# Patient Record
Sex: MALE | Race: WHITE | Employment: FULL TIME | URBAN - METROPOLITAN AREA
[De-identification: names, ages, dates, MRNs, and addresses within clinical notes are randomized per-mention and may not be internally consistent; named-entity substitution may affect disease eponyms.]

---

## 2023-12-07 ENCOUNTER — APPOINTMENT (OUTPATIENT)
Dept: GENERAL RADIOLOGY | Age: 48
DRG: 580 | End: 2023-12-07
Payer: COMMERCIAL

## 2023-12-07 ENCOUNTER — HOSPITAL ENCOUNTER (INPATIENT)
Age: 48
LOS: 3 days | Discharge: HOME | DRG: 580 | End: 2023-12-10
Attending: EMERGENCY MEDICINE | Admitting: SURGERY
Payer: COMMERCIAL

## 2023-12-07 ENCOUNTER — APPOINTMENT (OUTPATIENT)
Dept: CT IMAGING | Age: 48
DRG: 580 | End: 2023-12-07
Payer: COMMERCIAL

## 2023-12-07 DIAGNOSIS — R79.89 ELEVATED TROPONIN: ICD-10-CM

## 2023-12-07 DIAGNOSIS — S22.42XA CLOSED FRACTURE OF MULTIPLE RIBS OF LEFT SIDE, INITIAL ENCOUNTER: ICD-10-CM

## 2023-12-07 DIAGNOSIS — E66.01 CLASS 2 SEVERE OBESITY DUE TO EXCESS CALORIES WITH SERIOUS COMORBIDITY AND BODY MASS INDEX (BMI) OF 35.0 TO 35.9 IN ADULT (HCC): ICD-10-CM
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
## 2023-12-07 DIAGNOSIS — R07.2 PRECORDIAL PAIN: ICD-10-CM

## 2023-12-07 DIAGNOSIS — R58 INTRA ABDOMINAL HEMORRHAGE: ICD-10-CM

## 2023-12-07 DIAGNOSIS — I48.0 PAF (PAROXYSMAL ATRIAL FIBRILLATION) (HCC): ICD-10-CM

## 2023-12-07 DIAGNOSIS — T14.90XA TRAUMA: ICD-10-CM

## 2023-12-07 LAB
ABO + RH BLD: NORMAL
ALBUMIN SERPL-MCNC: 4.1 G/DL (ref 3.5–5.2)
ALP SERPL-CCNC: 84 U/L (ref 40–129)
ALT SERPL-CCNC: 112 U/L (ref 0–40)
AMPHET UR QL SCN: NEGATIVE
ANION GAP SERPL CALCULATED.3IONS-SCNC: 15 MMOL/L (ref 7–16)
APAP SERPL-MCNC: <5 UG/ML (ref 10–30)
ARM BAND NUMBER: NORMAL
AST SERPL-CCNC: 109 U/L (ref 0–39)
B.E.: -3.9 MMOL/L (ref -3–3)
BARBITURATES UR QL SCN: NEGATIVE
BASOPHILS # BLD: 0.05 K/UL (ref 0–0.2)
BASOPHILS NFR BLD: 0 % (ref 0–2)
BENZODIAZ UR QL: NEGATIVE
BILIRUB SERPL-MCNC: 0.5 MG/DL (ref 0–1.2)
BLOOD BANK SAMPLE EXPIRATION: NORMAL
BLOOD GROUP ANTIBODIES SERPL: NEGATIVE
BUN SERPL-MCNC: 18 MG/DL (ref 6–20)
BUPRENORPHINE UR QL: NEGATIVE
CALCIUM SERPL-MCNC: 8.3 MG/DL (ref 8.6–10.2)
CANNABINOIDS UR QL SCN: NEGATIVE
CHLORIDE SERPL-SCNC: 103 MMOL/L (ref 98–107)
CLOT ANGLE.KAOLIN INDUCED BLD RES TEG: 69.3 DEG (ref 53–70)
CO2 SERPL-SCNC: 20 MMOL/L (ref 22–29)
COCAINE UR QL SCN: NEGATIVE
COHB: 0.6 % (ref 0–1.5)
CREAT SERPL-MCNC: 1 MG/DL (ref 0.7–1.2)
CRITICAL: ABNORMAL
DATE ANALYZED: ABNORMAL
DATE OF COLLECTION: ABNORMAL
EKG ATRIAL RATE: 93 BPM
EKG P AXIS: 51 DEGREES
EKG P-R INTERVAL: 208 MS
EKG Q-T INTERVAL: 360 MS
EKG QRS DURATION: 84 MS
EKG QTC CALCULATION (BAZETT): 447 MS
EKG R AXIS: 123 DEGREES
EKG T AXIS: 16 DEGREES
EKG VENTRICULAR RATE: 93 BPM
EOSINOPHIL # BLD: 0 K/UL (ref 0.05–0.5)
EOSINOPHILS RELATIVE PERCENT: 0 % (ref 0–6)
EPL-TEG: 0 % (ref 0–15)
ERYTHROCYTE [DISTWIDTH] IN BLOOD BY AUTOMATED COUNT: 12.9 % (ref 11.5–15)
ETHANOLAMINE SERPL-MCNC: 14 MG/DL
ETHANOLAMINE SERPL-MCNC: <10 MG/DL
FENTANYL UR QL: NEGATIVE
G-TEG: 10.5 KDYN/CM2 (ref 4.5–11)
GFR SERPL CREATININE-BSD FRML MDRD: >60 ML/MIN/1.73M2
GLUCOSE SERPL-MCNC: 200 MG/DL (ref 74–99)
HCO3: 19.3 MMOL/L (ref 22–26)
HCT VFR BLD AUTO: 40 % (ref 37–54)
HCT VFR BLD AUTO: 43.3 % (ref 37–54)
HGB BLD-MCNC: 13.6 G/DL (ref 12.5–16.5)
HGB BLD-MCNC: 14.4 G/DL (ref 12.5–16.5)
HHB: 1.6 % (ref 0–5)
IMM GRANULOCYTES # BLD AUTO: 0.08 K/UL (ref 0–0.58)
IMM GRANULOCYTES NFR BLD: 0 % (ref 0–5)
KINETICS TEG: 1.5 MIN (ref 1–3)
LAB: ABNORMAL
LACTATE BLDV-SCNC: 2.4 MMOL/L (ref 0.5–2.2)
LY30 (LYSIS) TEG: 0 % (ref 0–8)
LYMPHOCYTES NFR BLD: 1.09 K/UL (ref 1.5–4)
LYMPHOCYTES RELATIVE PERCENT: 6 % (ref 20–42)
Lab: 1150
MA (MAX CLOT) TEG: 67.7 MM (ref 50–70)
MCH RBC QN AUTO: 29.5 PG (ref 26–35)
MCHC RBC AUTO-ENTMCNC: 33.3 G/DL (ref 32–34.5)
MCV RBC AUTO: 88.7 FL (ref 80–99.9)
METHADONE UR QL: NEGATIVE
METHB: 0.3 % (ref 0–1.5)
MODE: ABNORMAL
MONOCYTES NFR BLD: 1.12 K/UL (ref 0.1–0.95)
MONOCYTES NFR BLD: 6 % (ref 2–12)
NEUTROPHILS NFR BLD: 87 % (ref 43–80)
NEUTS SEG NFR BLD: 15.53 K/UL (ref 1.8–7.3)
O2 SATURATION: 98.4 % (ref 92–98.5)
O2HB: 97.5 % (ref 94–97)
OPERATOR ID: 5100
OPIATES UR QL SCN: NEGATIVE
OXYCODONE UR QL SCN: NEGATIVE
PATIENT TEMP: 37 C
PCO2: 30.4 MMHG (ref 35–45)
PCP UR QL SCN: NEGATIVE
PH BLOOD GAS: 7.42 (ref 7.35–7.45)
PLATELET # BLD AUTO: 216 K/UL (ref 130–450)
PMV BLD AUTO: 11.5 FL (ref 7–12)
PO2: 141.2 MMHG (ref 75–100)
POTASSIUM SERPL-SCNC: 4.8 MMOL/L (ref 3.5–5)
PROT SERPL-MCNC: 6.9 G/DL (ref 6.4–8.3)
RBC # BLD AUTO: 4.88 M/UL (ref 3.8–5.8)
REACTION TIME TEG: 4.3 MIN (ref 5–10)
SALICYLATES SERPL-MCNC: <0.3 MG/DL (ref 0–30)
SODIUM SERPL-SCNC: 138 MMOL/L (ref 132–146)
SOURCE, BLOOD GAS: ABNORMAL
TEST INFORMATION: NORMAL
THB: 14.7 G/DL (ref 11.5–16.5)
TIME ANALYZED: 1159
TOXIC TRICYCLIC SC,BLOOD: NEGATIVE
TROPONIN I SERPL HS-MCNC: 109 NG/L (ref 0–11)
TROPONIN I SERPL HS-MCNC: 60 NG/L (ref 0–11)
TROPONIN I SERPL HS-MCNC: 82 NG/L (ref 0–11)
TSH SERPL DL<=0.05 MIU/L-ACNC: 0.91 UIU/ML (ref 0.27–4.2)
WBC OTHER # BLD: 17.9 K/UL (ref 4.5–11.5)

## 2023-12-07 PROCEDURE — 2580000003 HC RX 258

## 2023-12-07 PROCEDURE — 72125 CT NECK SPINE W/O DYE: CPT

## 2023-12-07 PROCEDURE — 84443 ASSAY THYROID STIM HORMONE: CPT

## 2023-12-07 PROCEDURE — 6810039000 HC L1 TRAUMA ALERT

## 2023-12-07 PROCEDURE — 85384 FIBRINOGEN ACTIVITY: CPT

## 2023-12-07 PROCEDURE — 2060000000 HC ICU INTERMEDIATE R&B

## 2023-12-07 PROCEDURE — 85014 HEMATOCRIT: CPT

## 2023-12-07 PROCEDURE — 99285 EMERGENCY DEPT VISIT HI MDM: CPT

## 2023-12-07 PROCEDURE — 36410 VNPNXR 3YR/> PHY/QHP DX/THER: CPT | Performed by: SURGERY

## 2023-12-07 PROCEDURE — 36600 WITHDRAWAL OF ARTERIAL BLOOD: CPT | Performed by: SURGERY

## 2023-12-07 PROCEDURE — 93005 ELECTROCARDIOGRAM TRACING: CPT

## 2023-12-07 PROCEDURE — 86900 BLOOD TYPING SEROLOGIC ABO: CPT

## 2023-12-07 PROCEDURE — 82805 BLOOD GASES W/O2 SATURATION: CPT

## 2023-12-07 PROCEDURE — 74177 CT ABD & PELVIS W/CONTRAST: CPT

## 2023-12-07 PROCEDURE — 70450 CT HEAD/BRAIN W/O DYE: CPT

## 2023-12-07 PROCEDURE — 94150 VITAL CAPACITY TEST: CPT

## 2023-12-07 PROCEDURE — 6370000000 HC RX 637 (ALT 250 FOR IP)

## 2023-12-07 PROCEDURE — 86901 BLOOD TYPING SEROLOGIC RH(D): CPT

## 2023-12-07 PROCEDURE — 85576 BLOOD PLATELET AGGREGATION: CPT

## 2023-12-07 PROCEDURE — 83605 ASSAY OF LACTIC ACID: CPT

## 2023-12-07 PROCEDURE — 6360000002 HC RX W HCPCS

## 2023-12-07 PROCEDURE — G0480 DRUG TEST DEF 1-7 CLASSES: HCPCS

## 2023-12-07 PROCEDURE — 80307 DRUG TEST PRSMV CHEM ANLYZR: CPT

## 2023-12-07 PROCEDURE — 80053 COMPREHEN METABOLIC PANEL: CPT

## 2023-12-07 PROCEDURE — 93010 ELECTROCARDIOGRAM REPORT: CPT | Performed by: INTERNAL MEDICINE

## 2023-12-07 PROCEDURE — 70486 CT MAXILLOFACIAL W/O DYE: CPT

## 2023-12-07 PROCEDURE — 80143 DRUG ASSAY ACETAMINOPHEN: CPT

## 2023-12-07 PROCEDURE — 71260 CT THORAX DX C+: CPT

## 2023-12-07 PROCEDURE — 85347 COAGULATION TIME ACTIVATED: CPT

## 2023-12-07 PROCEDURE — 85390 FIBRINOLYSINS SCREEN I&R: CPT

## 2023-12-07 PROCEDURE — 71045 X-RAY EXAM CHEST 1 VIEW: CPT

## 2023-12-07 PROCEDURE — 72170 X-RAY EXAM OF PELVIS: CPT

## 2023-12-07 PROCEDURE — 6360000004 HC RX CONTRAST MEDICATION: Performed by: RADIOLOGY

## 2023-12-07 PROCEDURE — 36415 COLL VENOUS BLD VENIPUNCTURE: CPT

## 2023-12-07 PROCEDURE — 85025 COMPLETE CBC W/AUTO DIFF WBC: CPT

## 2023-12-07 PROCEDURE — 86850 RBC ANTIBODY SCREEN: CPT

## 2023-12-07 PROCEDURE — 84484 ASSAY OF TROPONIN QUANT: CPT

## 2023-12-07 PROCEDURE — 99285 EMERGENCY DEPT VISIT HI MDM: CPT | Performed by: SURGERY

## 2023-12-07 PROCEDURE — 80179 DRUG ASSAY SALICYLATE: CPT

## 2023-12-07 PROCEDURE — 85018 HEMOGLOBIN: CPT

## 2023-12-07 RX ORDER — SENNA AND DOCUSATE SODIUM 50; 8.6 MG/1; MG/1
1 TABLET, FILM COATED ORAL 2 TIMES DAILY
Status: DISCONTINUED | OUTPATIENT
Start: 2023-12-07 | End: 2023-12-10 | Stop reason: HOSPADM

## 2023-12-07 RX ORDER — ONDANSETRON 4 MG/1
4 TABLET, ORALLY DISINTEGRATING ORAL EVERY 8 HOURS PRN
Status: DISCONTINUED | OUTPATIENT
Start: 2023-12-07 | End: 2023-12-10 | Stop reason: HOSPADM

## 2023-12-07 RX ORDER — TRAMADOL HYDROCHLORIDE 50 MG/1
50 TABLET ORAL EVERY 6 HOURS PRN
Status: DISCONTINUED | OUTPATIENT
Start: 2023-12-07 | End: 2023-12-08

## 2023-12-07 RX ORDER — SODIUM CHLORIDE, SODIUM LACTATE, POTASSIUM CHLORIDE, CALCIUM CHLORIDE 600; 310; 30; 20 MG/100ML; MG/100ML; MG/100ML; MG/100ML
INJECTION, SOLUTION INTRAVENOUS CONTINUOUS
Status: DISCONTINUED | OUTPATIENT
Start: 2023-12-07 | End: 2023-12-08

## 2023-12-07 RX ORDER — POLYETHYLENE GLYCOL 3350 17 G/17G
17 POWDER, FOR SOLUTION ORAL DAILY
Status: DISCONTINUED | OUTPATIENT
Start: 2023-12-07 | End: 2023-12-10 | Stop reason: HOSPADM

## 2023-12-07 RX ORDER — PAROXETINE HYDROCHLORIDE 40 MG/1
40 TABLET, FILM COATED ORAL EVERY MORNING
COMMUNITY

## 2023-12-07 RX ORDER — OXYCODONE HYDROCHLORIDE 10 MG/1
10 TABLET ORAL EVERY 4 HOURS PRN
Status: DISCONTINUED | OUTPATIENT
Start: 2023-12-07 | End: 2023-12-07

## 2023-12-07 RX ORDER — BENAZEPRIL HYDROCHLORIDE 10 MG/1
10 TABLET ORAL DAILY
COMMUNITY

## 2023-12-07 RX ORDER — METHOCARBAMOL 500 MG/1
1000 TABLET, FILM COATED ORAL 4 TIMES DAILY
Status: DISCONTINUED | OUTPATIENT
Start: 2023-12-07 | End: 2023-12-10 | Stop reason: HOSPADM

## 2023-12-07 RX ORDER — FLECAINIDE ACETATE 100 MG/1
100 TABLET ORAL 2 TIMES DAILY
COMMUNITY

## 2023-12-07 RX ORDER — LIDOCAINE HYDROCHLORIDE AND EPINEPHRINE 10; 10 MG/ML; UG/ML
INJECTION, SOLUTION INFILTRATION; PERINEURAL
Status: COMPLETED
Start: 2023-12-07 | End: 2023-12-08

## 2023-12-07 RX ORDER — SODIUM CHLORIDE 0.9 % (FLUSH) 0.9 %
10 SYRINGE (ML) INJECTION EVERY 12 HOURS SCHEDULED
Status: DISCONTINUED | OUTPATIENT
Start: 2023-12-07 | End: 2023-12-10 | Stop reason: HOSPADM

## 2023-12-07 RX ORDER — GUSELKUMAB 100 MG/ML
100 INJECTION SUBCUTANEOUS
COMMUNITY

## 2023-12-07 RX ORDER — SODIUM CHLORIDE 0.9 % (FLUSH) 0.9 %
10 SYRINGE (ML) INJECTION PRN
Status: DISCONTINUED | OUTPATIENT
Start: 2023-12-07 | End: 2023-12-10 | Stop reason: HOSPADM

## 2023-12-07 RX ORDER — SODIUM CHLORIDE 9 MG/ML
INJECTION, SOLUTION INTRAVENOUS PRN
Status: DISCONTINUED | OUTPATIENT
Start: 2023-12-07 | End: 2023-12-10 | Stop reason: HOSPADM

## 2023-12-07 RX ORDER — ACETAMINOPHEN 325 MG/1
650 TABLET ORAL EVERY 4 HOURS PRN
Status: DISCONTINUED | OUTPATIENT
Start: 2023-12-07 | End: 2023-12-10 | Stop reason: HOSPADM

## 2023-12-07 RX ORDER — BACITRACIN ZINC 500 [USP'U]/G
OINTMENT TOPICAL ONCE
Status: COMPLETED | OUTPATIENT
Start: 2023-12-07 | End: 2023-12-07

## 2023-12-07 RX ORDER — ONDANSETRON 2 MG/ML
4 INJECTION INTRAMUSCULAR; INTRAVENOUS EVERY 6 HOURS PRN
Status: DISCONTINUED | OUTPATIENT
Start: 2023-12-07 | End: 2023-12-10 | Stop reason: HOSPADM

## 2023-12-07 RX ORDER — METOPROLOL SUCCINATE 100 MG/1
100 TABLET, EXTENDED RELEASE ORAL DAILY
COMMUNITY

## 2023-12-07 RX ORDER — OXYCODONE HYDROCHLORIDE 5 MG/1
5 TABLET ORAL EVERY 4 HOURS PRN
Status: DISCONTINUED | OUTPATIENT
Start: 2023-12-07 | End: 2023-12-07

## 2023-12-07 RX ADMIN — ONDANSETRON 4 MG: 2 INJECTION INTRAMUSCULAR; INTRAVENOUS at 12:46

## 2023-12-07 RX ADMIN — IOPAMIDOL 75 ML: 755 INJECTION, SOLUTION INTRAVENOUS at 12:30

## 2023-12-07 RX ADMIN — OXYCODONE HYDROCHLORIDE 10 MG: 10 TABLET ORAL at 15:50

## 2023-12-07 RX ADMIN — BACITRACIN ZINC: 500 OINTMENT TOPICAL at 15:54

## 2023-12-07 RX ADMIN — SODIUM CHLORIDE, POTASSIUM CHLORIDE, SODIUM LACTATE AND CALCIUM CHLORIDE: 600; 310; 30; 20 INJECTION, SOLUTION INTRAVENOUS at 13:19

## 2023-12-07 RX ADMIN — METHOCARBAMOL 1000 MG: 500 TABLET ORAL at 22:35

## 2023-12-07 RX ADMIN — METHOCARBAMOL 1000 MG: 500 TABLET ORAL at 15:50

## 2023-12-07 RX ADMIN — HYDROMORPHONE HYDROCHLORIDE 0.5 MG: 1 INJECTION, SOLUTION INTRAMUSCULAR; INTRAVENOUS; SUBCUTANEOUS at 12:44

## 2023-12-07 ASSESSMENT — PAIN DESCRIPTION - ORIENTATION: ORIENTATION: RIGHT

## 2023-12-07 ASSESSMENT — PAIN SCALES - GENERAL
PAINLEVEL_OUTOF10: 8
PAINLEVEL_OUTOF10: 5
PAINLEVEL_OUTOF10: 5
PAINLEVEL_OUTOF10: 9
PAINLEVEL_OUTOF10: 7

## 2023-12-07 ASSESSMENT — PAIN - FUNCTIONAL ASSESSMENT
PAIN_FUNCTIONAL_ASSESSMENT: 0-10
PAIN_FUNCTIONAL_ASSESSMENT: PREVENTS OR INTERFERES SOME ACTIVE ACTIVITIES AND ADLS

## 2023-12-07 ASSESSMENT — PAIN DESCRIPTION - LOCATION
LOCATION: RIB CAGE
LOCATION: CHEST

## 2023-12-07 ASSESSMENT — PAIN DESCRIPTION - DESCRIPTORS
DESCRIPTORS: ACHING;DISCOMFORT;STABBING;THROBBING
DESCRIPTORS: ACHING

## 2023-12-07 ASSESSMENT — LIFESTYLE VARIABLES
HOW OFTEN DO YOU HAVE A DRINK CONTAINING ALCOHOL: NEVER
HOW MANY STANDARD DRINKS CONTAINING ALCOHOL DO YOU HAVE ON A TYPICAL DAY: PATIENT DOES NOT DRINK

## 2023-12-07 NOTE — ED NOTES
Dr. Aguilar Benitez notified of patients increasing troponin.       Carolina Peterson RN  12/07/23 1022

## 2023-12-07 NOTE — ED NOTES
Abrasion noted to left elbow. Trauma panel drawn by dr. Parvez Alcazar to right femerol artery-pt tolerated well and pressure applied per protocol.       Kym Olivares RN  12/07/23 2086

## 2023-12-07 NOTE — ED NOTES
Pt to ed room 10 from ct scan-report given to [address]und[address] Morro, rn  and barb, rn.      Darrell Yoo, RN  12/07/23 9648

## 2023-12-07 NOTE — ED NOTES
Abrasion noted to left chest and left knee. Pt logrolled and spinal neutrality maintained-back palpated by dr. Robles Smaller findings.       Jason Dowell RN  12/07/23 0947

## 2023-12-07 NOTE — ED NOTES
Abg drawn by dr. Pastora Trejo via right femerol artery-pt tolerated well and pressure held per protocol. Smi 500.      Meri Tran RN  12/07/23 1153       Meri Trna RN  12/07/23 115

## 2023-12-07 NOTE — ED NOTES
Nurse to nurse called to [facility].       Navdeep Bettencourt RN  12/07/23 1998

## 2023-12-07 NOTE — ED NOTES
C collar applied on arrival to ed. Nrm applied on arrival to ed.       Kimberly Rossi RN  12/07/23 6379

## 2023-12-07 NOTE — ED NOTES
Lacerations noted to forehead/scalp/bridge of nose.  Pt to ct scan with this rn.      Delisa Bella RN  12/07/23 1273

## 2023-12-07 NOTE — PROCEDURES
Laceration Repair Procedure Note    Indication: Laceration to the left forehead    Procedure: Patient patient had a laceration to the left forehead. Laceration has a hematoma inside which was evacuated. Laceration was then cleaned with normal saline and then with Betadine. Area was then cleansed and prepped and sterile fashion. Local anesthesia was administered administered with 1% lidocaine with epinephrine. Laceration extended to subcutaneous tissue with some bleeding from dermis. Bleeding was controlled with Vicryl sutures. Laceration was then approximated with Vicryl sutures as well. Skin was then closed with chromic gut suture in a interlocked continuous fashion. Total repaired wound length: 15 cm. Other Items: 10 cc lidocaine used     The patient tolerated the procedure well.     Complications: None    Electronically signed by Brody Webb DO on 12/7/2023 at 6:57 PM Yes

## 2023-12-08 ENCOUNTER — APPOINTMENT (OUTPATIENT)
Age: 48
DRG: 580 | End: 2023-12-08
Attending: INTERNAL MEDICINE
Payer: COMMERCIAL

## 2023-12-08 PROBLEM — I25.10 CORONARY ARTERY DISEASE INVOLVING NATIVE CORONARY ARTERY OF NATIVE HEART WITHOUT ANGINA PECTORIS: Status: ACTIVE | Noted: 2023-12-08

## 2023-12-08 PROBLEM — S22.22XA CLOSED FRACTURE OF BODY OF STERNUM: Status: ACTIVE | Noted: 2023-12-08

## 2023-12-08 PROBLEM — R79.89 ELEVATED TROPONIN: Status: ACTIVE | Noted: 2023-12-08

## 2023-12-08 PROBLEM — I48.0 PAF (PAROXYSMAL ATRIAL FIBRILLATION) (HCC): Status: ACTIVE | Noted: 2023-12-08

## 2023-12-08 PROBLEM — I10 PRIMARY HYPERTENSION: Status: ACTIVE | Noted: 2023-12-08

## 2023-12-08 PROBLEM — R58 INTRA ABDOMINAL HEMORRHAGE: Status: ACTIVE | Noted: 2023-12-08
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
## 2023-12-08 LAB
ANION GAP SERPL CALCULATED.3IONS-SCNC: 18 MMOL/L (ref 7–16)
BASOPHILS # BLD: 0.02 K/UL (ref 0–0.2)
BASOPHILS NFR BLD: 0 % (ref 0–2)
BUN SERPL-MCNC: 15 MG/DL (ref 6–20)
CALCIUM SERPL-MCNC: 8.3 MG/DL (ref 8.6–10.2)
CHLORIDE SERPL-SCNC: 101 MMOL/L (ref 98–107)
CHOLEST SERPL-MCNC: 131 MG/DL
CO2 SERPL-SCNC: 18 MMOL/L (ref 22–29)
CREAT SERPL-MCNC: 0.8 MG/DL (ref 0.7–1.2)
ECHO AV MEAN GRADIENT: 2 MMHG
ECHO AV MEAN VELOCITY: 0.7 M/S
ECHO AV PEAK GRADIENT: 4 MMHG
ECHO AV PEAK VELOCITY: 1 M/S
ECHO AV VELOCITY RATIO: 0.8
ECHO AV VTI: 16.8 CM
ECHO BSA: 2.44 M2
ECHO LV EDV A4C: 89 ML
ECHO LV EDV INDEX A4C: 38 ML/M2
ECHO LV EF PHYSICIAN: 50 %
ECHO LV EJECTION FRACTION A4C: 57 %
ECHO LV ESV A4C: 38 ML
ECHO LV ESV INDEX A4C: 16 ML/M2
ECHO LV ISOVOLUMETRIC RELAXATION TIME (IVRT): 110.7 MS
ECHO LVOT AV VTI INDEX: 0.86
ECHO LVOT MEAN GRADIENT: 2 MMHG
ECHO LVOT PEAK GRADIENT: 3 MMHG
ECHO LVOT PEAK VELOCITY: 0.8 M/S
ECHO LVOT VTI: 14.4 CM
ECHO MV A VELOCITY: 0.73 M/S
ECHO MV AREA PHT: 4.1 CM2
ECHO MV E DECELERATION TIME (DT): 197.6 MS
ECHO MV E VELOCITY: 0.83 M/S
ECHO MV E/A RATIO: 1.14
ECHO MV LVOT VTI INDEX: 1.49
ECHO MV MAX VELOCITY: 0.9 M/S
ECHO MV MEAN GRADIENT: 2 MMHG
ECHO MV MEAN VELOCITY: 0.6 M/S
ECHO MV PEAK GRADIENT: 4 MMHG
ECHO MV PRESSURE HALF TIME (PHT): 53.8 MS
ECHO MV VTI: 21.4 CM
ECHO PV MAX VELOCITY: 0.9 M/S
ECHO PV MEAN GRADIENT: 2 MMHG
ECHO PV MEAN VELOCITY: 0.7 M/S
ECHO PV PEAK GRADIENT: 3 MMHG
ECHO PV VTI: 15.2 CM
EOSINOPHIL # BLD: 0.01 K/UL (ref 0.05–0.5)
EOSINOPHILS RELATIVE PERCENT: 0 % (ref 0–6)
ERYTHROCYTE [DISTWIDTH] IN BLOOD BY AUTOMATED COUNT: 13 % (ref 11.5–15)
GFR SERPL CREATININE-BSD FRML MDRD: >60 ML/MIN/1.73M2
GLUCOSE SERPL-MCNC: 174 MG/DL (ref 74–99)
HBA1C MFR BLD: 6.8 % (ref 4–5.6)
HCT VFR BLD AUTO: 39.8 % (ref 37–54)
HDLC SERPL-MCNC: 31 MG/DL
HGB BLD-MCNC: 13.2 G/DL (ref 12.5–16.5)
IMM GRANULOCYTES # BLD AUTO: 0.04 K/UL (ref 0–0.58)
IMM GRANULOCYTES NFR BLD: 0 % (ref 0–5)
LDLC SERPL CALC-MCNC: 58 MG/DL
LYMPHOCYTES NFR BLD: 1.85 K/UL (ref 1.5–4)
LYMPHOCYTES RELATIVE PERCENT: 18 % (ref 20–42)
MAGNESIUM SERPL-MCNC: 2 MG/DL (ref 1.6–2.6)
MCH RBC QN AUTO: 29.2 PG (ref 26–35)
MCHC RBC AUTO-ENTMCNC: 33.2 G/DL (ref 32–34.5)
MCV RBC AUTO: 88.1 FL (ref 80–99.9)
MONOCYTES NFR BLD: 0.9 K/UL (ref 0.1–0.95)
MONOCYTES NFR BLD: 9 % (ref 2–12)
NEUTROPHILS NFR BLD: 73 % (ref 43–80)
NEUTS SEG NFR BLD: 7.43 K/UL (ref 1.8–7.3)
PLATELET # BLD AUTO: 177 K/UL (ref 130–450)
PMV BLD AUTO: 11.5 FL (ref 7–12)
POTASSIUM SERPL-SCNC: 4.3 MMOL/L (ref 3.5–5)
RBC # BLD AUTO: 4.52 M/UL (ref 3.8–5.8)
SODIUM SERPL-SCNC: 137 MMOL/L (ref 132–146)
TRIGL SERPL-MCNC: 208 MG/DL
TROPONIN I SERPL HS-MCNC: 118 NG/L (ref 0–11)
VLDLC SERPL CALC-MCNC: 42 MG/DL
WBC OTHER # BLD: 10.3 K/UL (ref 4.5–11.5)

## 2023-12-08 PROCEDURE — 85025 COMPLETE CBC W/AUTO DIFF WBC: CPT

## 2023-12-08 PROCEDURE — 0JQ10ZZ REPAIR FACE SUBCUTANEOUS TISSUE AND FASCIA, OPEN APPROACH: ICD-10-PCS

## 2023-12-08 PROCEDURE — APPSS60 APP SPLIT SHARED TIME 46-60 MINUTES: Performed by: CLINICAL NURSE SPECIALIST

## 2023-12-08 PROCEDURE — 83036 HEMOGLOBIN GLYCOSYLATED A1C: CPT

## 2023-12-08 PROCEDURE — C8929 TTE W OR WO FOL WCON,DOPPLER: HCPCS

## 2023-12-08 PROCEDURE — 6360000004 HC RX CONTRAST MEDICATION

## 2023-12-08 PROCEDURE — 09QKXZZ REPAIR NASAL MUCOSA AND SOFT TISSUE, EXTERNAL APPROACH: ICD-10-PCS

## 2023-12-08 PROCEDURE — 93306 TTE W/DOPPLER COMPLETE: CPT | Performed by: INTERNAL MEDICINE

## 2023-12-08 PROCEDURE — 99255 IP/OBS CONSLTJ NEW/EST HI 80: CPT | Performed by: INTERNAL MEDICINE

## 2023-12-08 PROCEDURE — 84484 ASSAY OF TROPONIN QUANT: CPT

## 2023-12-08 PROCEDURE — 80061 LIPID PANEL: CPT

## 2023-12-08 PROCEDURE — 6370000000 HC RX 637 (ALT 250 FOR IP)

## 2023-12-08 PROCEDURE — 0HQ0XZZ REPAIR SCALP SKIN, EXTERNAL APPROACH: ICD-10-PCS

## 2023-12-08 PROCEDURE — 6360000002 HC RX W HCPCS

## 2023-12-08 PROCEDURE — 36415 COLL VENOUS BLD VENIPUNCTURE: CPT

## 2023-12-08 PROCEDURE — 80048 BASIC METABOLIC PNL TOTAL CA: CPT

## 2023-12-08 PROCEDURE — 2500000003 HC RX 250 WO HCPCS

## 2023-12-08 PROCEDURE — 2700000000 HC OXYGEN THERAPY PER DAY

## 2023-12-08 PROCEDURE — 2060000000 HC ICU INTERMEDIATE R&B

## 2023-12-08 PROCEDURE — 2580000003 HC RX 258

## 2023-12-08 PROCEDURE — 83735 ASSAY OF MAGNESIUM: CPT

## 2023-12-08 PROCEDURE — 0JB10ZZ EXCISION OF FACE SUBCUTANEOUS TISSUE AND FASCIA, OPEN APPROACH: ICD-10-PCS

## 2023-12-08 PROCEDURE — 99232 SBSQ HOSP IP/OBS MODERATE 35: CPT | Performed by: SURGERY

## 2023-12-08 RX ORDER — LIDOCAINE HYDROCHLORIDE AND EPINEPHRINE 10; 10 MG/ML; UG/ML
20 INJECTION, SOLUTION INFILTRATION; PERINEURAL ONCE
Status: COMPLETED | OUTPATIENT
Start: 2023-12-08 | End: 2023-12-08

## 2023-12-08 RX ORDER — OXYCODONE HYDROCHLORIDE 10 MG/1
10 TABLET ORAL EVERY 4 HOURS PRN
Status: DISCONTINUED | OUTPATIENT
Start: 2023-12-08 | End: 2023-12-10 | Stop reason: HOSPADM

## 2023-12-08 RX ORDER — ACETAMINOPHEN 650 MG
TABLET, EXTENDED RELEASE ORAL ONCE
Status: COMPLETED | OUTPATIENT
Start: 2023-12-08 | End: 2023-12-08

## 2023-12-08 RX ORDER — PAROXETINE HYDROCHLORIDE 20 MG/1
40 TABLET, FILM COATED ORAL EVERY MORNING
Status: DISCONTINUED | OUTPATIENT
Start: 2023-12-08 | End: 2023-12-10 | Stop reason: HOSPADM

## 2023-12-08 RX ORDER — LIDOCAINE 4 G/G
1 PATCH TOPICAL DAILY
Status: DISCONTINUED | OUTPATIENT
Start: 2023-12-08 | End: 2023-12-10 | Stop reason: HOSPADM

## 2023-12-08 RX ORDER — BACITRACIN ZINC 500 [USP'U]/G
OINTMENT TOPICAL 3 TIMES DAILY
Status: DISCONTINUED | OUTPATIENT
Start: 2023-12-08 | End: 2023-12-10 | Stop reason: HOSPADM

## 2023-12-08 RX ORDER — METOPROLOL SUCCINATE 100 MG/1
100 TABLET, EXTENDED RELEASE ORAL DAILY
Status: DISCONTINUED | OUTPATIENT
Start: 2023-12-08 | End: 2023-12-10 | Stop reason: HOSPADM

## 2023-12-08 RX ORDER — ENOXAPARIN SODIUM 100 MG/ML
30 INJECTION SUBCUTANEOUS 2 TIMES DAILY
Status: DISCONTINUED | OUTPATIENT
Start: 2023-12-08 | End: 2023-12-10 | Stop reason: HOSPADM

## 2023-12-08 RX ORDER — FLECAINIDE ACETATE 100 MG/1
100 TABLET ORAL 2 TIMES DAILY
Status: DISCONTINUED | OUTPATIENT
Start: 2023-12-08 | End: 2023-12-10 | Stop reason: HOSPADM

## 2023-12-08 RX ORDER — OXYCODONE HYDROCHLORIDE 5 MG/1
5 TABLET ORAL EVERY 4 HOURS PRN
Status: DISCONTINUED | OUTPATIENT
Start: 2023-12-08 | End: 2023-12-10 | Stop reason: HOSPADM

## 2023-12-08 RX ORDER — KETOROLAC TROMETHAMINE 30 MG/ML
30 INJECTION, SOLUTION INTRAMUSCULAR; INTRAVENOUS EVERY 6 HOURS PRN
Status: DISCONTINUED | OUTPATIENT
Start: 2023-12-08 | End: 2023-12-10

## 2023-12-08 RX ADMIN — BACITRACIN ZINC: 500 OINTMENT TOPICAL at 20:56

## 2023-12-08 RX ADMIN — METHOCARBAMOL 1000 MG: 500 TABLET ORAL at 05:31

## 2023-12-08 RX ADMIN — SODIUM CHLORIDE, PRESERVATIVE FREE 10 ML: 5 INJECTION INTRAVENOUS at 20:49

## 2023-12-08 RX ADMIN — METHOCARBAMOL 1000 MG: 500 TABLET ORAL at 20:48

## 2023-12-08 RX ADMIN — Medication: at 08:29

## 2023-12-08 RX ADMIN — METOPROLOL SUCCINATE 100 MG: 100 TABLET, EXTENDED RELEASE ORAL at 08:18

## 2023-12-08 RX ADMIN — POLYETHYLENE GLYCOL 3350 17 G: 17 POWDER, FOR SOLUTION ORAL at 07:59

## 2023-12-08 RX ADMIN — METHOCARBAMOL 1000 MG: 500 TABLET ORAL at 12:39

## 2023-12-08 RX ADMIN — SENNOSIDES AND DOCUSATE SODIUM 1 TABLET: 8.6; 5 TABLET ORAL at 20:48

## 2023-12-08 RX ADMIN — SODIUM CHLORIDE, PRESERVATIVE FREE 10 ML: 5 INJECTION INTRAVENOUS at 07:59

## 2023-12-08 RX ADMIN — FLECAINIDE ACETATE 100 MG: 100 TABLET ORAL at 20:52

## 2023-12-08 RX ADMIN — ENOXAPARIN SODIUM 30 MG: 100 INJECTION SUBCUTANEOUS at 20:48

## 2023-12-08 RX ADMIN — LIDOCAINE HYDROCHLORIDE AND EPINEPHRINE 20 ML: 10; 10 INJECTION, SOLUTION INFILTRATION; PERINEURAL at 08:21

## 2023-12-08 RX ADMIN — BACITRACIN ZINC: 500 OINTMENT TOPICAL at 08:17

## 2023-12-08 RX ADMIN — PERFLUTREN 1.5 ML: 6.52 INJECTION, SUSPENSION INTRAVENOUS at 09:25

## 2023-12-08 RX ADMIN — METHOCARBAMOL 1000 MG: 500 TABLET ORAL at 07:59

## 2023-12-08 RX ADMIN — BACITRACIN ZINC: 500 OINTMENT TOPICAL at 14:00

## 2023-12-08 RX ADMIN — Medication: at 08:16

## 2023-12-08 RX ADMIN — LIDOCAINE HYDROCHLORIDE,EPINEPHRINE BITARTRATE 20 ML: 10; .01 INJECTION, SOLUTION INFILTRATION; PERINEURAL at 13:10

## 2023-12-08 RX ADMIN — LIDOCAINE HYDROCHLORIDE,EPINEPHRINE BITARTRATE 20 ML: 10; .01 INJECTION, SOLUTION INFILTRATION; PERINEURAL at 08:21

## 2023-12-08 RX ADMIN — HYDROMORPHONE HYDROCHLORIDE 0.5 MG: 1 INJECTION, SOLUTION INTRAMUSCULAR; INTRAVENOUS; SUBCUTANEOUS at 20:52

## 2023-12-08 RX ADMIN — Medication: at 13:11

## 2023-12-08 RX ADMIN — SENNOSIDES AND DOCUSATE SODIUM 1 TABLET: 8.6; 5 TABLET ORAL at 07:59

## 2023-12-08 RX ADMIN — PAROXETINE HYDROCHLORIDE 40 MG: 20 TABLET, FILM COATED ORAL at 08:19

## 2023-12-08 RX ADMIN — FLECAINIDE ACETATE 100 MG: 100 TABLET ORAL at 08:18

## 2023-12-08 RX ADMIN — OXYCODONE HYDROCHLORIDE 10 MG: 10 TABLET ORAL at 16:31

## 2023-12-08 RX ADMIN — HYDROMORPHONE HYDROCHLORIDE 0.5 MG: 1 INJECTION, SOLUTION INTRAMUSCULAR; INTRAVENOUS; SUBCUTANEOUS at 12:37

## 2023-12-08 RX ADMIN — HYDROMORPHONE HYDROCHLORIDE 0.5 MG: 1 INJECTION, SOLUTION INTRAMUSCULAR; INTRAVENOUS; SUBCUTANEOUS at 07:58

## 2023-12-08 RX ADMIN — ENOXAPARIN SODIUM 30 MG: 100 INJECTION SUBCUTANEOUS at 13:01

## 2023-12-08 ASSESSMENT — PAIN DESCRIPTION - LOCATION
LOCATION: CHEST;OTHER (COMMENT)
LOCATION: CHEST
LOCATION: CHEST
LOCATION: CHEST;OTHER (COMMENT)

## 2023-12-08 ASSESSMENT — PAIN DESCRIPTION - PAIN TYPE: TYPE: ACUTE PAIN

## 2023-12-08 ASSESSMENT — PAIN SCALES - GENERAL
PAINLEVEL_OUTOF10: 5
PAINLEVEL_OUTOF10: 10
PAINLEVEL_OUTOF10: 9
PAINLEVEL_OUTOF10: 5
PAINLEVEL_OUTOF10: 4
PAINLEVEL_OUTOF10: 5
PAINLEVEL_OUTOF10: 10
PAINLEVEL_OUTOF10: 8
PAINLEVEL_OUTOF10: 2
PAINLEVEL_OUTOF10: 0

## 2023-12-08 ASSESSMENT — PAIN DESCRIPTION - ORIENTATION
ORIENTATION: MID
ORIENTATION: MID
ORIENTATION: RIGHT;LEFT

## 2023-12-08 ASSESSMENT — PAIN DESCRIPTION - DESCRIPTORS
DESCRIPTORS: DISCOMFORT;SHARP
DESCRIPTORS: SHARP
DESCRIPTORS: THROBBING;ACHING;DISCOMFORT

## 2023-12-08 ASSESSMENT — PAIN DESCRIPTION - ONSET: ONSET: SUDDEN

## 2023-12-08 ASSESSMENT — PAIN - FUNCTIONAL ASSESSMENT
PAIN_FUNCTIONAL_ASSESSMENT: PREVENTS OR INTERFERES WITH ALL ACTIVE AND SOME PASSIVE ACTIVITIES
PAIN_FUNCTIONAL_ASSESSMENT: ACTIVITIES ARE NOT PREVENTED
PAIN_FUNCTIONAL_ASSESSMENT: PREVENTS OR INTERFERES WITH MANY ACTIVE NOT PASSIVE ACTIVITIES

## 2023-12-08 ASSESSMENT — PAIN DESCRIPTION - FREQUENCY: FREQUENCY: INTERMITTENT

## 2023-12-08 NOTE — PROGRESS NOTES
Date: 2023       Patient Name: Usha Gilbert  : 1975      MRN: [id number]    PT held pending STAT echo at bedside. Will follow up as appropriate.      Sandip Hernandez PT, DPT  [id number]
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
## 2023-12-08 NOTE — PROGRESS NOTES
Brendon Mccormick notified of [facility] cardio per [employer].  Pt added to census Chanel is 15w 2d pregnant 

## 2023-12-08 NOTE — PLAN OF CARE
Problem: Discharge Planning  Goal: Discharge to home or other facility with appropriate resources  Outcome: Progressing  Flowsheets (Taken 12/7/2023 2115)  Discharge to home or other facility with appropriate resources:   Identify barriers to discharge with patient and caregiver   Arrange for needed discharge resources and transportation as appropriate   Identify discharge learning needs (meds, wound care, etc)     Problem: Pain  Goal: Verbalizes/displays adequate comfort level or baseline comfort level  Outcome: Progressing

## 2023-12-08 NOTE — PROCEDURES
.Laceration Repair Procedure Note    Procedure: Laceration repair of laceration to nasal bridge, right ear and posterior scalp    Indication: As above    Procedure: A nasal bridge, right ear and posterior scalp laceration was present after patient face was cleaned and some scab was removed. Wound was cleaned with Betadine. It was then prepped in sterile fashion. Anesthesia around the laceration achieved using injection of 10 mL lidocaine w epinephrine. The laceration extended down to the level of subcutaneous tissue. Laceration to the posterior scalp and ear were closed with chromic gut in a interlocked continuous fashion. Laceration to the nasal bridge was closed with 3 chromic gut sutures in an interrupted fashion. Total repaired wound length: Laceration to posterior scalp was about 5 cm  Laceration to the ear was about 3 cm  Laceration to the nasal bridge was about 1 cm  Total of 10 cc of lidocaine with epinephrine was used    The patient tolerated the procedure well.     Complications: None    Dr. Vargas [facility] was available for the procedure     Electronically signed by Brianna Frederick DO on 12/8/2023 at 8:37 AM

## 2023-12-08 NOTE — PROGRESS NOTES
Occupational Therapy  OT SESSION ATTEMPT     Date:2023  Patient Name: Jocelyn Farias  MRN: [id number]  : 1975  Room: [address] [place] [address]     Attempted OT session this date:    [x] unavailable due to other medical staff currently with pt; STAT echo at bedside   [] on hold per nursing staff   [] on hold per nursing staff secondary to lab / radiology results    [] declined Occupational Therapy  this date due to ___. Benefits of participation in therapy reviewed with pt.    [] off unit   [] Other:     Will reattempt OT evaluation at a later time.     Stanislaw Chambers OTR/L #[id number]

## 2023-12-08 NOTE — PROCEDURES
LACERATION REPAIR PROCEDURE NOTE    Indication: Lacerations to the face    Procedure: The patient was placed in the appropriate position and anesthesia around the lacerations were obtained by infiltration using 1% Lidocaine without epinephrine. The area was then cleansed with betadine and draped in a sterile fashion. The laceration was closed with chromic gut. A second, third, fourth, laceration were closed with chromic gut. The wound area was then dressed with bacitracin. Minimal debridement was preformed, flaps were aligned. No foreign body was identified. Total repaired wound length: 3 cm. Other Items: None    The patient tolerated the procedure well.     Complications: None      Tony Block DO  Resident, PGY-1  12/8/2023  2:12 PM

## 2023-12-08 NOTE — PROGRESS NOTES
Pt rolled for personal care, pt SPO2 dropped to 88%. Pt sat up and Spo2 styed between 88%-90%, pt placed back on 2L Nasal cannula.
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
## 2023-12-08 NOTE — PROGRESS NOTES
This nurse attempted to clean blood from pts face and head, pt continued to bleed from abrasions, family helped clean pt up as well until pt unable to tolerate.
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
## 2023-12-08 NOTE — PROGRESS NOTES
4 Eyes Skin Assessment     NAME:  Gabriel Sesay  YOB: 1975  MEDICAL RECORD NUMBER:  [id number]    The patient is being assessed for  Admission    I agree that at least one RN has performed a thorough Head to Toe Skin Assessment on the patient. ALL assessment sites listed below have been assessed. Areas assessed by both nurses:    Head, Face, Ears, Shoulders, Back, Chest, Arms, Elbows, Hands, Sacrum. Buttock, Coccyx, Ischium, and Legs. Feet and Heels        Does the Patient have a Wound?  No noted wound(s)       Andrey Prevention initiated by RN: Yes  Wound Care Orders initiated by RN: No    Pressure Injury (Stage 3,4, Unstageable, DTI, NWPT, and Complex wounds) if present, place Wound referral order by RN under : No    New Ostomies, if present place, Ostomy referral order under : No     Nurse 1 eSignature: Electronically signed by Sloane Carlisle RN on 12/8/23 at 4:27 AM EST    **SHARE this note so that the co-signing nurse can place an eSignature**    Nurse 2 eSignature: Electronically signed by Souleymane Yip RN on 12/8/23 at 4:36 AM EST

## 2023-12-08 NOTE — DISCHARGE INSTRUCTIONS
TRAUMA SERVICES DISCHARGE INSTRUCTIONS    Call [phone], option 2, for any questions/concerns and for follow-up appointment in *** {DAY/WK/MON/YRS:20513}. Please follow the instructions checked below:    During the course of your workup, we identified an incidental finding of:  ***  Please follow-up with your primary care provider. ACTIVITY INSTRUCTIONS  Increase activity as tolerated  No heavy lifting or strenuous activity  Take your incentive spirometer home and use 4-6 times/day   []  No driving until cleared by ***    WOUND/DRESSING INSTRUCTIONS:  You may shower. No sitting in bath tub, hot tub or swimming until cleared by physician. Ice to areas of pain for first 24 hours. Heat to areas of pain after that. Wash areas of lacerations/abrasions with soap & water. Rinse well. Pat dry with clean towel. Apply thin layer of Bacitracin, Neosporin, or triple antibiotic cream to affected area 2-3 times per day. Keep wounds clean and dry. []  Sutures/Staples are to be removed in *** {DAY/WK/MON/YRS:20513}. MEDICATION INSTRUCTIONS  Take medication as prescribed. When taking pain medications, you may experience dizziness or drowsiness. Do not drink alcohol or drive when taking these medications. You may experience constipation while taking pain medication. You may take over the counter stool softeners such as docusate (Colace), sennosides S (Senokot-S), or Miralax.   []  You may take Ibuprofen (over the counter) as directed for mild pain. --You may take up to 800mg every 8 hours for pain, please take with food or milk.   []  You may take acetaminophen (Tylenol) products. Do NOT take more than 4000mg of Tylenol in 24h. []  Do not take any other acetaminophen (Tylenol) products if you are taking Percocet or Norco, as these contain Tylenol. --Do NOT take more than 4000mg of Tylenol in 24h.     OPIOID MEDICATION INSTRUCTIONS  Read the medication guide that is included with your you will receive a statement from [employer] monitor a cash pay option is available and financial assistance programs are available for those who qualify. For insurance coverage, financial assistance, or out-of-pocket estimates, call [employer] at [phone]    Prompt Return of the [employer] Monitor:  The device and kit components must be returned immediately upon completion of wear using the pre-paid . Delays in return may result in delayed final test results.     FOR ASSISTANCE WITH YOUR Raymond Molina CALL [facility] 24/7 at [phone]
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
## 2023-12-09 LAB
ANION GAP SERPL CALCULATED.3IONS-SCNC: 12 MMOL/L (ref 7–16)
ANION GAP SERPL CALCULATED.3IONS-SCNC: 14 MMOL/L (ref 7–16)
ANION GAP SERPL CALCULATED.3IONS-SCNC: 15 MMOL/L (ref 7–16)
BASOPHILS # BLD: 0.04 K/UL (ref 0–0.2)
BASOPHILS # BLD: 0.05 K/UL (ref 0–0.2)
BASOPHILS NFR BLD: 0 % (ref 0–2)
BASOPHILS NFR BLD: 0 % (ref 0–2)
BUN SERPL-MCNC: 14 MG/DL (ref 6–20)
BUN SERPL-MCNC: 15 MG/DL (ref 6–20)
BUN SERPL-MCNC: 16 MG/DL (ref 6–20)
CALCIUM SERPL-MCNC: 8.4 MG/DL (ref 8.6–10.2)
CALCIUM SERPL-MCNC: 8.6 MG/DL (ref 8.6–10.2)
CALCIUM SERPL-MCNC: 8.6 MG/DL (ref 8.6–10.2)
CHLORIDE SERPL-SCNC: 90 MMOL/L (ref 98–107)
CHLORIDE SERPL-SCNC: 92 MMOL/L (ref 98–107)
CHLORIDE SERPL-SCNC: 94 MMOL/L (ref 98–107)
CO2 SERPL-SCNC: 21 MMOL/L (ref 22–29)
CO2 SERPL-SCNC: 23 MMOL/L (ref 22–29)
CO2 SERPL-SCNC: 24 MMOL/L (ref 22–29)
CREAT SERPL-MCNC: 0.8 MG/DL (ref 0.7–1.2)
CREAT SERPL-MCNC: 0.9 MG/DL (ref 0.7–1.2)
CREAT SERPL-MCNC: 0.9 MG/DL (ref 0.7–1.2)
EOSINOPHIL # BLD: 0.02 K/UL (ref 0.05–0.5)
EOSINOPHIL # BLD: 0.02 K/UL (ref 0.05–0.5)
EOSINOPHILS RELATIVE PERCENT: 0 % (ref 0–6)
EOSINOPHILS RELATIVE PERCENT: 0 % (ref 0–6)
ERYTHROCYTE [DISTWIDTH] IN BLOOD BY AUTOMATED COUNT: 12.8 % (ref 11.5–15)
ERYTHROCYTE [DISTWIDTH] IN BLOOD BY AUTOMATED COUNT: 13.2 % (ref 11.5–15)
GFR SERPL CREATININE-BSD FRML MDRD: >60 ML/MIN/1.73M2
GLUCOSE SERPL-MCNC: 158 MG/DL (ref 74–99)
GLUCOSE SERPL-MCNC: 199 MG/DL (ref 74–99)
GLUCOSE SERPL-MCNC: 200 MG/DL (ref 74–99)
HCT VFR BLD AUTO: 37.3 % (ref 37–54)
HCT VFR BLD AUTO: 37.8 % (ref 37–54)
HGB BLD-MCNC: 12.4 G/DL (ref 12.5–16.5)
HGB BLD-MCNC: 12.5 G/DL (ref 12.5–16.5)
IMM GRANULOCYTES # BLD AUTO: 0.07 K/UL (ref 0–0.58)
IMM GRANULOCYTES # BLD AUTO: 0.07 K/UL (ref 0–0.58)
IMM GRANULOCYTES NFR BLD: 1 % (ref 0–5)
IMM GRANULOCYTES NFR BLD: 1 % (ref 0–5)
LYMPHOCYTES NFR BLD: 1.64 K/UL (ref 1.5–4)
LYMPHOCYTES NFR BLD: 2.07 K/UL (ref 1.5–4)
LYMPHOCYTES RELATIVE PERCENT: 14 % (ref 20–42)
LYMPHOCYTES RELATIVE PERCENT: 18 % (ref 20–42)
MCH RBC QN AUTO: 29.2 PG (ref 26–35)
MCH RBC QN AUTO: 29.5 PG (ref 26–35)
MCHC RBC AUTO-ENTMCNC: 32.8 G/DL (ref 32–34.5)
MCHC RBC AUTO-ENTMCNC: 33.5 G/DL (ref 32–34.5)
MCV RBC AUTO: 87.1 FL (ref 80–99.9)
MCV RBC AUTO: 90 FL (ref 80–99.9)
MONOCYTES NFR BLD: 1.08 K/UL (ref 0.1–0.95)
MONOCYTES NFR BLD: 1.16 K/UL (ref 0.1–0.95)
MONOCYTES NFR BLD: 10 % (ref 2–12)
MONOCYTES NFR BLD: 9 % (ref 2–12)
NEUTROPHILS NFR BLD: 72 % (ref 43–80)
NEUTROPHILS NFR BLD: 75 % (ref 43–80)
NEUTS SEG NFR BLD: 8.58 K/UL (ref 1.8–7.3)
NEUTS SEG NFR BLD: 8.84 K/UL (ref 1.8–7.3)
PLATELET # BLD AUTO: 172 K/UL (ref 130–450)
PLATELET # BLD AUTO: 191 K/UL (ref 130–450)
PMV BLD AUTO: 11 FL (ref 7–12)
PMV BLD AUTO: 11.4 FL (ref 7–12)
POTASSIUM SERPL-SCNC: 4 MMOL/L (ref 3.5–5)
POTASSIUM SERPL-SCNC: 4.3 MMOL/L (ref 3.5–5)
POTASSIUM SERPL-SCNC: 4.5 MMOL/L (ref 3.5–5)
RBC # BLD AUTO: 4.2 M/UL (ref 3.8–5.8)
RBC # BLD AUTO: 4.28 M/UL (ref 3.8–5.8)
SODIUM SERPL-SCNC: 126 MMOL/L (ref 132–146)
SODIUM SERPL-SCNC: 128 MMOL/L (ref 132–146)
SODIUM SERPL-SCNC: 131 MMOL/L (ref 132–146)
WBC OTHER # BLD: 11.8 K/UL (ref 4.5–11.5)
WBC OTHER # BLD: 11.9 K/UL (ref 4.5–11.5)

## 2023-12-09 PROCEDURE — 36415 COLL VENOUS BLD VENIPUNCTURE: CPT

## 2023-12-09 PROCEDURE — 6370000000 HC RX 637 (ALT 250 FOR IP)

## 2023-12-09 PROCEDURE — 85025 COMPLETE CBC W/AUTO DIFF WBC: CPT

## 2023-12-09 PROCEDURE — 99232 SBSQ HOSP IP/OBS MODERATE 35: CPT | Performed by: INTERNAL MEDICINE

## 2023-12-09 PROCEDURE — 6360000002 HC RX W HCPCS

## 2023-12-09 PROCEDURE — 80048 BASIC METABOLIC PNL TOTAL CA: CPT

## 2023-12-09 PROCEDURE — 99232 SBSQ HOSP IP/OBS MODERATE 35: CPT | Performed by: SURGERY

## 2023-12-09 PROCEDURE — 2060000000 HC ICU INTERMEDIATE R&B

## 2023-12-09 PROCEDURE — 2580000003 HC RX 258

## 2023-12-09 RX ORDER — ACETYLCYSTEINE 100 MG/ML
4 SOLUTION ORAL; RESPIRATORY (INHALATION) EVERY 4 HOURS
Status: DISCONTINUED | OUTPATIENT
Start: 2023-12-09 | End: 2023-12-10 | Stop reason: HOSPADM

## 2023-12-09 RX ORDER — SODIUM BICARBONATE 650 MG/1
650 TABLET ORAL 4 TIMES DAILY
Status: DISCONTINUED | OUTPATIENT
Start: 2023-12-09 | End: 2023-12-09

## 2023-12-09 RX ORDER — SODIUM BICARBONATE 650 MG/1
650 TABLET ORAL 4 TIMES DAILY
Status: DISCONTINUED | OUTPATIENT
Start: 2023-12-09 | End: 2023-12-10

## 2023-12-09 RX ORDER — 3% SODIUM CHLORIDE 3 G/100ML
25 INJECTION, SOLUTION INTRAVENOUS CONTINUOUS
Status: DISCONTINUED | OUTPATIENT
Start: 2023-12-09 | End: 2023-12-09

## 2023-12-09 RX ORDER — IPRATROPIUM BROMIDE AND ALBUTEROL SULFATE 2.5; .5 MG/3ML; MG/3ML
1 SOLUTION RESPIRATORY (INHALATION)
Status: DISCONTINUED | OUTPATIENT
Start: 2023-12-09 | End: 2023-12-10 | Stop reason: HOSPADM

## 2023-12-09 RX ADMIN — HYDROMORPHONE HYDROCHLORIDE 0.5 MG: 1 INJECTION, SOLUTION INTRAMUSCULAR; INTRAVENOUS; SUBCUTANEOUS at 02:14

## 2023-12-09 RX ADMIN — PAROXETINE HYDROCHLORIDE 40 MG: 20 TABLET, FILM COATED ORAL at 09:07

## 2023-12-09 RX ADMIN — OXYCODONE HYDROCHLORIDE 10 MG: 10 TABLET ORAL at 20:29

## 2023-12-09 RX ADMIN — SODIUM BICARBONATE 650 MG: 650 TABLET ORAL at 15:50

## 2023-12-09 RX ADMIN — SENNOSIDES AND DOCUSATE SODIUM 1 TABLET: 8.6; 5 TABLET ORAL at 20:24

## 2023-12-09 RX ADMIN — METHOCARBAMOL 1000 MG: 500 TABLET ORAL at 09:07

## 2023-12-09 RX ADMIN — BACITRACIN ZINC: 500 OINTMENT TOPICAL at 20:32

## 2023-12-09 RX ADMIN — ACETAMINOPHEN 650 MG: 325 TABLET ORAL at 11:58

## 2023-12-09 RX ADMIN — METOPROLOL SUCCINATE 100 MG: 100 TABLET, EXTENDED RELEASE ORAL at 09:07

## 2023-12-09 RX ADMIN — METHOCARBAMOL 1000 MG: 500 TABLET ORAL at 13:30

## 2023-12-09 RX ADMIN — SODIUM CHLORIDE, PRESERVATIVE FREE 10 ML: 5 INJECTION INTRAVENOUS at 20:24

## 2023-12-09 RX ADMIN — BACITRACIN ZINC: 500 OINTMENT TOPICAL at 09:24

## 2023-12-09 RX ADMIN — KETOROLAC TROMETHAMINE 30 MG: 30 INJECTION, SOLUTION INTRAMUSCULAR at 13:34

## 2023-12-09 RX ADMIN — ENOXAPARIN SODIUM 30 MG: 100 INJECTION SUBCUTANEOUS at 20:24

## 2023-12-09 RX ADMIN — HYDROMORPHONE HYDROCHLORIDE 0.5 MG: 1 INJECTION, SOLUTION INTRAMUSCULAR; INTRAVENOUS; SUBCUTANEOUS at 12:02

## 2023-12-09 RX ADMIN — POLYETHYLENE GLYCOL 3350 17 G: 17 POWDER, FOR SOLUTION ORAL at 09:07

## 2023-12-09 RX ADMIN — SENNOSIDES AND DOCUSATE SODIUM 1 TABLET: 8.6; 5 TABLET ORAL at 09:07

## 2023-12-09 RX ADMIN — BACITRACIN ZINC: 500 OINTMENT TOPICAL at 16:21

## 2023-12-09 RX ADMIN — SODIUM CHLORIDE, PRESERVATIVE FREE 10 ML: 5 INJECTION INTRAVENOUS at 09:12

## 2023-12-09 RX ADMIN — METHOCARBAMOL 1000 MG: 500 TABLET ORAL at 05:57

## 2023-12-09 RX ADMIN — FLECAINIDE ACETATE 100 MG: 100 TABLET ORAL at 20:24

## 2023-12-09 RX ADMIN — OXYCODONE HYDROCHLORIDE 10 MG: 10 TABLET ORAL at 09:07

## 2023-12-09 RX ADMIN — ENOXAPARIN SODIUM 30 MG: 100 INJECTION SUBCUTANEOUS at 09:36

## 2023-12-09 RX ADMIN — METHOCARBAMOL 1000 MG: 500 TABLET ORAL at 20:24

## 2023-12-09 RX ADMIN — SODIUM BICARBONATE 650 MG: 650 TABLET ORAL at 20:24

## 2023-12-09 RX ADMIN — FLECAINIDE ACETATE 100 MG: 100 TABLET ORAL at 09:07

## 2023-12-09 ASSESSMENT — PAIN SCALES - GENERAL
PAINLEVEL_OUTOF10: 7
PAINLEVEL_OUTOF10: 0
PAINLEVEL_OUTOF10: 10
PAINLEVEL_OUTOF10: 0
PAINLEVEL_OUTOF10: 7
PAINLEVEL_OUTOF10: 0
PAINLEVEL_OUTOF10: 8
PAINLEVEL_OUTOF10: 0
PAINLEVEL_OUTOF10: 7
PAINLEVEL_OUTOF10: 2

## 2023-12-09 ASSESSMENT — PAIN DESCRIPTION - LOCATION
LOCATION: STERNUM
LOCATION: CHEST;OTHER (COMMENT)
LOCATION: CHEST
LOCATION: CHEST
LOCATION: RIB CAGE;STERNUM

## 2023-12-09 ASSESSMENT — PAIN DESCRIPTION - ORIENTATION
ORIENTATION: MID
ORIENTATION: RIGHT;LEFT

## 2023-12-09 ASSESSMENT — PAIN - FUNCTIONAL ASSESSMENT
PAIN_FUNCTIONAL_ASSESSMENT: PREVENTS OR INTERFERES WITH MANY ACTIVE NOT PASSIVE ACTIVITIES
PAIN_FUNCTIONAL_ASSESSMENT: PREVENTS OR INTERFERES WITH MANY ACTIVE NOT PASSIVE ACTIVITIES
PAIN_FUNCTIONAL_ASSESSMENT: ACTIVITIES ARE NOT PREVENTED
PAIN_FUNCTIONAL_ASSESSMENT: ACTIVITIES ARE NOT PREVENTED

## 2023-12-09 ASSESSMENT — PAIN DESCRIPTION - DESCRIPTORS
DESCRIPTORS: ACHING;SHARP
DESCRIPTORS: ACHING;THROBBING;DISCOMFORT
DESCRIPTORS: ACHING;DISCOMFORT
DESCRIPTORS: ACHING;DISCOMFORT;SHARP

## 2023-12-09 NOTE — PLAN OF CARE
Problem: Discharge Planning  Goal: Discharge to home or other facility with appropriate resources  Recent Flowsheet Documentation  Taken 12/8/2023 2030 by Bridger Benson RN  Discharge to home or other facility with appropriate resources:   Identify barriers to discharge with patient and caregiver   Arrange for needed discharge resources and transportation as appropriate   Identify discharge learning needs (meds, wound care, etc)  12/8/2023 2000 by Awa Manzo RN  Outcome: Progressing     Problem: Pain  Goal: Verbalizes/displays adequate comfort level or baseline comfort level  12/9/2023 0203 by Bridger Benson RN  Outcome: Progressing  12/8/2023 2000 by Awa Manzo RN  Outcome: Progressing     Problem: ABCDS Injury Assessment  Goal: Absence of physical injury  12/9/2023 0203 by Bridger Benson RN  Outcome: Progressing  12/8/2023 2000 by Awa Manzo RN  Outcome: Progressing     Problem: Skin/Tissue Integrity  Goal: Absence of new skin breakdown  Description: 1. Monitor for areas of redness and/or skin breakdown  2. Assess vascular access sites hourly  3. Every 4-6 hours minimum:  Change oxygen saturation probe site  4. Every 4-6 hours:  If on nasal continuous positive airway pressure, respiratory therapy assess nares and determine need for appliance change or resting period.   12/9/2023 0203 by Bridger Benson RN  Outcome: Progressing  12/8/2023 2000 by Awa Manzo RN  Outcome: Progressing     Problem: Safety - Adult  Goal: Free from fall injury  12/9/2023 0203 by Bridger Benson RN  Outcome: Progressing  12/8/2023 2000 by Awa Manzo RN  Outcome: Progressing

## 2023-12-09 NOTE — PLAN OF CARE
Problem: Pain  Goal: Verbalizes/displays adequate comfort level or baseline comfort level  12/9/2023 1120 by Wyatt Garcia RN  Outcome: Progressing  12/9/2023 0203 by Michelle Charlton RN  Outcome: Progressing     Problem: Skin/Tissue Integrity  Goal: Absence of new skin breakdown  Description: 1. Monitor for areas of redness and/or skin breakdown  2. Assess vascular access sites hourly  3. Every 4-6 hours minimum:  Change oxygen saturation probe site  4. Every 4-6 hours:  If on nasal continuous positive airway pressure, respiratory therapy assess nares and determine need for appliance change or resting period.   12/9/2023 1120 by Wyatt Garcia RN  Outcome: Progressing  12/9/2023 0203 by Michelle Charlton RN  Outcome: Progressing     Problem: Safety - Adult  Goal: Free from fall injury  12/9/2023 1120 by Wyatt Garcia RN  Outcome: Progressing  12/9/2023 0203 by Michelle Charlton RN  Outcome: Progressing
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
## 2023-12-10 VITALS
HEIGHT: 70 IN | WEIGHT: 265 LBS | HEART RATE: 86 BPM | DIASTOLIC BLOOD PRESSURE: 86 MMHG | RESPIRATION RATE: 16 BRPM | TEMPERATURE: 98.5 F | OXYGEN SATURATION: 96 % | BODY MASS INDEX: 37.94 KG/M2 | SYSTOLIC BLOOD PRESSURE: 148 MMHG

## 2023-12-10 LAB
ANION GAP SERPL CALCULATED.3IONS-SCNC: 14 MMOL/L (ref 7–16)
BASOPHILS # BLD: 0.04 K/UL (ref 0–0.2)
BASOPHILS NFR BLD: 0 % (ref 0–2)
BUN SERPL-MCNC: 14 MG/DL (ref 6–20)
CALCIUM SERPL-MCNC: 8.4 MG/DL (ref 8.6–10.2)
CHLORIDE SERPL-SCNC: 94 MMOL/L (ref 98–107)
CO2 SERPL-SCNC: 21 MMOL/L (ref 22–29)
CREAT SERPL-MCNC: 0.8 MG/DL (ref 0.7–1.2)
EOSINOPHIL # BLD: 0.03 K/UL (ref 0.05–0.5)
EOSINOPHILS RELATIVE PERCENT: 0 % (ref 0–6)
ERYTHROCYTE [DISTWIDTH] IN BLOOD BY AUTOMATED COUNT: 12.6 % (ref 11.5–15)
GFR SERPL CREATININE-BSD FRML MDRD: >60 ML/MIN/1.73M2
GLUCOSE SERPL-MCNC: 184 MG/DL (ref 74–99)
HCT VFR BLD AUTO: 34.5 % (ref 37–54)
HGB BLD-MCNC: 11.6 G/DL (ref 12.5–16.5)
IMM GRANULOCYTES # BLD AUTO: 0.07 K/UL (ref 0–0.58)
IMM GRANULOCYTES NFR BLD: 1 % (ref 0–5)
LYMPHOCYTES NFR BLD: 1.63 K/UL (ref 1.5–4)
LYMPHOCYTES RELATIVE PERCENT: 16 % (ref 20–42)
MCH RBC QN AUTO: 29.4 PG (ref 26–35)
MCHC RBC AUTO-ENTMCNC: 33.6 G/DL (ref 32–34.5)
MCV RBC AUTO: 87.3 FL (ref 80–99.9)
MONOCYTES NFR BLD: 0.95 K/UL (ref 0.1–0.95)
MONOCYTES NFR BLD: 9 % (ref 2–12)
NEUTROPHILS NFR BLD: 74 % (ref 43–80)
NEUTS SEG NFR BLD: 7.58 K/UL (ref 1.8–7.3)
PLATELET # BLD AUTO: 173 K/UL (ref 130–450)
PMV BLD AUTO: 11.4 FL (ref 7–12)
POTASSIUM SERPL-SCNC: 4.1 MMOL/L (ref 3.5–5)
RBC # BLD AUTO: 3.95 M/UL (ref 3.8–5.8)
SODIUM SERPL-SCNC: 129 MMOL/L (ref 132–146)
WBC OTHER # BLD: 10.3 K/UL (ref 4.5–11.5)

## 2023-12-10 PROCEDURE — 97530 THERAPEUTIC ACTIVITIES: CPT

## 2023-12-10 PROCEDURE — 97165 OT EVAL LOW COMPLEX 30 MIN: CPT

## 2023-12-10 PROCEDURE — 36415 COLL VENOUS BLD VENIPUNCTURE: CPT

## 2023-12-10 PROCEDURE — 85025 COMPLETE CBC W/AUTO DIFF WBC: CPT

## 2023-12-10 PROCEDURE — 93242 EXT ECG>48HR<7D RECORDING: CPT

## 2023-12-10 PROCEDURE — 6360000002 HC RX W HCPCS

## 2023-12-10 PROCEDURE — 94640 AIRWAY INHALATION TREATMENT: CPT

## 2023-12-10 PROCEDURE — 97161 PT EVAL LOW COMPLEX 20 MIN: CPT

## 2023-12-10 PROCEDURE — 99232 SBSQ HOSP IP/OBS MODERATE 35: CPT | Performed by: INTERNAL MEDICINE

## 2023-12-10 PROCEDURE — 97535 SELF CARE MNGMENT TRAINING: CPT

## 2023-12-10 PROCEDURE — 6370000000 HC RX 637 (ALT 250 FOR IP)

## 2023-12-10 PROCEDURE — 80048 BASIC METABOLIC PNL TOTAL CA: CPT

## 2023-12-10 PROCEDURE — 2580000003 HC RX 258

## 2023-12-10 PROCEDURE — 99238 HOSP IP/OBS DSCHRG MGMT 30/<: CPT | Performed by: SURGERY

## 2023-12-10 PROCEDURE — 6370000000 HC RX 637 (ALT 250 FOR IP): Performed by: SURGERY

## 2023-12-10 RX ORDER — OXYCODONE HYDROCHLORIDE 5 MG/1
5 TABLET ORAL EVERY 6 HOURS PRN
Qty: 24 TABLET | Refills: 0 | Status: SHIPPED | OUTPATIENT
Start: 2023-12-10 | End: 2023-12-17

## 2023-12-10 RX ORDER — BISACODYL 10 MG
10 SUPPOSITORY, RECTAL RECTAL ONCE
Status: COMPLETED | OUTPATIENT
Start: 2023-12-10 | End: 2023-12-10

## 2023-12-10 RX ORDER — LIDOCAINE 4 G/G
1 PATCH TOPICAL DAILY
Qty: 120 EACH | Refills: 1 | Status: SHIPPED | OUTPATIENT
Start: 2023-12-11

## 2023-12-10 RX ORDER — SODIUM CHLORIDE 1 G/1
1 TABLET ORAL
Status: DISCONTINUED | OUTPATIENT
Start: 2023-12-10 | End: 2023-12-10 | Stop reason: HOSPADM

## 2023-12-10 RX ORDER — POLYETHYLENE GLYCOL 3350 17 G/17G
17 POWDER, FOR SOLUTION ORAL DAILY
Qty: 527 G | Refills: 0 | Status: SHIPPED | OUTPATIENT
Start: 2023-12-11 | End: 2024-01-10

## 2023-12-10 RX ORDER — METHOCARBAMOL 1000 MG/1
1000 TABLET, COATED ORAL 4 TIMES DAILY
Qty: 60 TABLET | Refills: 0 | Status: SHIPPED | OUTPATIENT
Start: 2023-12-10 | End: 2023-12-20

## 2023-12-10 RX ORDER — BACITRACIN ZINC 500 [USP'U]/G
OINTMENT TOPICAL
Qty: 30 G | Refills: 1 | Status: SHIPPED | OUTPATIENT
Start: 2023-12-10 | End: 2023-12-20

## 2023-12-10 RX ADMIN — IPRATROPIUM BROMIDE AND ALBUTEROL SULFATE 1 DOSE: 2.5; .5 SOLUTION RESPIRATORY (INHALATION) at 08:14

## 2023-12-10 RX ADMIN — POLYETHYLENE GLYCOL 3350 17 G: 17 POWDER, FOR SOLUTION ORAL at 09:04

## 2023-12-10 RX ADMIN — ACETYLCYSTEINE 400 MG: 100 SOLUTION ORAL; RESPIRATORY (INHALATION) at 11:16

## 2023-12-10 RX ADMIN — ENOXAPARIN SODIUM 30 MG: 100 INJECTION SUBCUTANEOUS at 09:03

## 2023-12-10 RX ADMIN — IPRATROPIUM BROMIDE AND ALBUTEROL SULFATE 1 DOSE: 2.5; .5 SOLUTION RESPIRATORY (INHALATION) at 11:16

## 2023-12-10 RX ADMIN — SODIUM CHLORIDE 1 G: 1 TABLET ORAL at 11:36

## 2023-12-10 RX ADMIN — METHOCARBAMOL 1000 MG: 500 TABLET ORAL at 05:44

## 2023-12-10 RX ADMIN — SODIUM BICARBONATE 650 MG: 650 TABLET ORAL at 09:03

## 2023-12-10 RX ADMIN — METHOCARBAMOL 1000 MG: 500 TABLET ORAL at 12:19

## 2023-12-10 RX ADMIN — ACETYLCYSTEINE 400 MG: 100 SOLUTION ORAL; RESPIRATORY (INHALATION) at 08:14

## 2023-12-10 RX ADMIN — OXYCODONE HYDROCHLORIDE 10 MG: 10 TABLET ORAL at 03:09

## 2023-12-10 RX ADMIN — METHOCARBAMOL 1000 MG: 500 TABLET ORAL at 09:03

## 2023-12-10 RX ADMIN — SODIUM CHLORIDE, PRESERVATIVE FREE 10 ML: 5 INJECTION INTRAVENOUS at 09:05

## 2023-12-10 RX ADMIN — OXYCODONE HYDROCHLORIDE 10 MG: 10 TABLET ORAL at 09:03

## 2023-12-10 RX ADMIN — PAROXETINE HYDROCHLORIDE 40 MG: 20 TABLET, FILM COATED ORAL at 09:05

## 2023-12-10 RX ADMIN — METOPROLOL SUCCINATE 100 MG: 100 TABLET, EXTENDED RELEASE ORAL at 09:03

## 2023-12-10 RX ADMIN — FLECAINIDE ACETATE 100 MG: 100 TABLET ORAL at 09:06

## 2023-12-10 RX ADMIN — SENNOSIDES AND DOCUSATE SODIUM 1 TABLET: 8.6; 5 TABLET ORAL at 09:04

## 2023-12-10 RX ADMIN — HYDROMORPHONE HYDROCHLORIDE 0.5 MG: 1 INJECTION, SOLUTION INTRAMUSCULAR; INTRAVENOUS; SUBCUTANEOUS at 03:49

## 2023-12-10 RX ADMIN — BISACODYL 10 MG: 10 SUPPOSITORY RECTAL at 11:36

## 2023-12-10 RX ADMIN — BACITRACIN ZINC: 500 OINTMENT TOPICAL at 09:07

## 2023-12-10 ASSESSMENT — PAIN SCALES - GENERAL
PAINLEVEL_OUTOF10: 9
PAINLEVEL_OUTOF10: 9
PAINLEVEL_OUTOF10: 5
PAINLEVEL_OUTOF10: 8
PAINLEVEL_OUTOF10: 10
PAINLEVEL_OUTOF10: 2
PAINLEVEL_OUTOF10: 0

## 2023-12-10 ASSESSMENT — PAIN DESCRIPTION - DESCRIPTORS
DESCRIPTORS: ACHING;DISCOMFORT;THROBBING
DESCRIPTORS: DISCOMFORT;ACHING;THROBBING
DESCRIPTORS: DISCOMFORT;SORE;ACHING

## 2023-12-10 ASSESSMENT — PAIN - FUNCTIONAL ASSESSMENT
PAIN_FUNCTIONAL_ASSESSMENT: ACTIVITIES ARE NOT PREVENTED
PAIN_FUNCTIONAL_ASSESSMENT: ACTIVITIES ARE NOT PREVENTED

## 2023-12-10 ASSESSMENT — PAIN DESCRIPTION - ORIENTATION: ORIENTATION: MID

## 2023-12-10 ASSESSMENT — PAIN DESCRIPTION - LOCATION
LOCATION: STERNUM
LOCATION: STERNUM
LOCATION: STERNUM;RIB CAGE
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
## 2023-12-10 NOTE — PLAN OF CARE
Problem: Discharge Planning  Goal: Discharge to home or other facility with appropriate resources  12/10/2023 0028 by Brittanie Quintana RN  Outcome: Progressing  Flowsheets (Taken 12/9/2023 2003)  Discharge to home or other facility with appropriate resources:   Identify barriers to discharge with patient and caregiver   Arrange for needed discharge resources and transportation as appropriate   Identify discharge learning needs (meds, wound care, etc)  12/9/2023 1120 by Tonia Clayton RN  Outcome: Progressing     Problem: Pain  Goal: Verbalizes/displays adequate comfort level or baseline comfort level  12/10/2023 0028 by Brittanie Quintana RN  Outcome: Progressing  12/9/2023 1120 by Tonia Clayton RN  Outcome: Progressing     Problem: ABCDS Injury Assessment  Goal: Absence of physical injury  12/9/2023 1120 by Tonia Clayton RN  Outcome: Progressing     Problem: Skin/Tissue Integrity  Goal: Absence of new skin breakdown  Description: 1. Monitor for areas of redness and/or skin breakdown  2. Assess vascular access sites hourly  3. Every 4-6 hours minimum:  Change oxygen saturation probe site  4. Every 4-6 hours:  If on nasal continuous positive airway pressure, respiratory therapy assess nares and determine need for appliance change or resting period.   12/9/2023 1120 by Tonia Clayton RN  Outcome: Progressing     Problem: Safety - Adult  Goal: Free from fall injury  12/10/2023 0028 by Brittanie Quintana RN  Outcome: Progressing  12/9/2023 1120 by Tonia Clayton RN  Outcome: Progressing

## 2023-12-10 NOTE — PROGRESS NOTES
SBA  Standing: SBA w/o AD  Sitting:     Static: Independent      Dynamic: Independent   Standing: Independent    Activity Tolerance Fair tolerance w/ light activity  Good   Visual/  Perceptual Glasses: Yes - readers  Appears WFL        Safety Fair+  Good  during ADL completion   Vitals Pt on RA upon arrival.  SpO2 at rest 96%. SpO2 during functional mobility. 86% - pt provided seated rest break & educated on PLB techniques w/ quick ~30 second recovery to 97%. SpO2 end of session 94%. BP supine 140/85, 87 HR. BP seated /89, 90 HR. BP standing 127/86, 88 HR. RN made aware. Hand Dominance Right   AROM (PROM) Strength Additional Info:  Goal: (PRN)   RUE  WFL 4/5 grossly tested good  and wfl FMC/dexterity noted during ADL tasks   Improve overall RUE strength WFL for participation in functional tasks       LUE WFL 4/5 grossly tested Good  and wfl FMC/dexterity noted during ADL tasks   Improve overall LUE strength WKL for participation in functional tasks       Hearing: [facility]  Sensation:  No c/o numbness or tingling  Tone: WFL  Edema: Unremarkable    Comment: Cleared by RN to see pt. Upon arrival patient supine in bed and agreeable to OT session. At end of session, patient seated in bedside chair with call light and phone within reach, all lines and tubes intact. Overall patient demonstrated decreased independence, activity tolerance, and safety during completion of ADL/functional transfer/mobility tasks. Therapist facilitated ADL tasks, functional transfers, functional mobility, bed mobility to address safety awareness, implementation of fall prevention strategies, & functional engagement throughout daily activities. Pt would benefit from continued skilled OT to increase safety and independence with completion of ADL/IADL tasks for functional independence and quality of life. Treatment: OT treatment provided this date includes:    ADL-  Instruction/training on safety and adapted techniques for completion of ADLs: Pt educated on activity modifications/adaptations to promote implementation of EC/WS strategies & independence throughout ADLs. Mobility-  Instruction/training on safety and improved independence with bed mobility/functional transfers and functional mobility. Pt demo'd slow corina - mild SOB during functional mobility. Sitting EOB ~5 minutes to improve dynamic sitting balance and activity tolerance during ADLs. Activity tolerance- Instruction/training on energy conservation/work simplification for completion of ADLs. Skilled positioning/alignment-  Therapist facilitated proper Positioning/Alignment throughout session to promote skin/joint integrity & proper body mechanics. Skilled monitoring of vitals -  To maximize safe participation throughout functional activities. Rehab Potential: Good for established goals     LTG: maximize independence with ADLs to return to PLOF    Patient and/or family were instructed on functional diagnosis, prognosis/goals and OT plan of care. Demonstrated fair+ understanding. Eval Complexity: Low  History: Expanded chart review of medical records and additional review of physical, cognitive, or psychosocial history related to current functional performance  Exam: 3+ performance deficits  Assistance/Modification: Min/mod assistance or modifications required to perform tasks. May have comorbidities that affect occupational performance. Evaluation time includes thorough review of current medical information, gathering information on past medical & social history & PLOF, completion of standardized testing, informal observation of tasks, consultation with other medical professions/disciplines, assessment of data & development of POC/goals.      Time In: 7:35a  Time Out: 7:58a  Total Treatment Time: 8 minutes    Min Units   OT Eval Low 71157  x     OT Eval Medium [id number]      OT Eval High [id number]      OT Re-Eval [id number]       Therapeutic Ex

## 2023-12-10 NOTE — PROGRESS NOTES
Sroc at bedside with pt, wife, and son to answer any questions. No further questions.   Went over discharge instructions in detail with pt, wife, son at bedside including instructions, new medications and follow up appointments  Yesi applied, instructions explained
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
## 2023-12-10 NOTE — PROGRESS NOTES
Nutrition Education    Educated on Heart healthy consistent CHO diet  Learners: Patient  Readiness:  CARRILLO  Method: Handout with nutrition guidelines and sample menu/contact # provided via discharge instructions  Response:  CARRILLO  Contact name and number provided.     Josette Whiteside RD  Contact Number: ext [phone]
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
## 2023-12-10 NOTE — CARE COORDINATION
Spoke with patient, discussed with him that I do not have an accepting homecare agency for discharge and it is unclear if further paperwork like PA version of C9 needs completed for auth for homecare. He does not feel homecare is needed at this time, feels he is moving well enough to return home without it. Ambulated without assistive device on room air. Family at bedside to transport home. For questions I can be reached at [phone].  [place]

## 2023-12-10 NOTE — PROGRESS NOTES
Notified sroc pt has still not had bowel movement, even after suppository  Dr Atif Burris stated pt still okay to dc

## 2023-12-11 ENCOUNTER — TELEPHONE (OUTPATIENT)
Dept: ADMINISTRATIVE | Age: 48
End: 2023-12-11
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
## 2024-01-07 PROBLEM — R79.89 ELEVATED TROPONIN: Status: RESOLVED | Noted: 2023-12-08 | Resolved: 2024-01-07

## 2024-01-08 NOTE — PROGRESS NOTES
Physician Progress Note      PATIENT:               URBAN VALENTINE  CSN #:                  [id number]  :                       1975  ADMIT DATE:       2023 11:38 AM  DISCH DATE:        12/10/2023 2:06 PM  RESPONDING  PROVIDER #:        Rafal Choi MD          QUERY TEXT:    Patient admitted with facial lacerations. Per Op note dated  documentation   of \"lacerations to the face\" and \"Minimal debridement\". To accurately reflect   the procedure performed please document if debridement was excisional or   nonexcisional and the deepest depth of tissue removed as down to and   including:    The medical record reflects the following:  Risk Factors: Lacerations to the face  Clinical Indicators: Per op note  \"Minimal debridement\"  Treatment: operative repair    Thank you,  Lisandro Campbell RN BSN, CCDS  Options provided:  -- Nonexcisional debridement of skin  -- Excisional debridement of skin  -- Nonexcisional debridement of subcutaneous tissue  -- Excisional debridement of subcutaneous tissue  -- Nonexcisional debridement of fascia  -- Excisional debridement of fascia  -- Nonexcisional debridement of muscle  -- Excisional debridement of muscle  -- Other - I will add my own diagnosis  -- Disagree - Not applicable / Not valid  -- Disagree - Clinically unable to determine / Unknown  -- Refer to Clinical Documentation Reviewer    PROVIDER RESPONSE TEXT:    Excisional debridement of subcutaneous tissue of face during procedure on     Query created by: Andrew Campbell on 2023 3:16 PM      Electronically signed by:  Rafal Choi MD 2024 4:14 PM

## 2024-03-19 ENCOUNTER — TRANSCRIBE ORDERS (OUTPATIENT)
Dept: ADMINISTRATIVE | Age: 49
End: 2024-03-19

## 2024-03-19 DIAGNOSIS — R93.3 ABNORMAL VIRTUAL COLONOSCOPE: ICD-10-CM

## 2024-03-19 DIAGNOSIS — S22.42XA CLOSED FRACTURE OF MULTIPLE RIBS OF LEFT SIDE, INITIAL ENCOUNTER: Primary | ICD-10-CM

## 2024-03-21 ENCOUNTER — TRANSCRIBE ORDERS (OUTPATIENT)
Dept: ADMINISTRATIVE | Age: 49
End: 2024-03-21

## 2024-03-21 DIAGNOSIS — S22.42XA CLOSED FRACTURE OF MULTIPLE RIBS OF LEFT SIDE, INITIAL ENCOUNTER: Primary | ICD-10-CM

## 2024-03-21 DIAGNOSIS — R93.3 GASTROINTESTINAL TRACT IMAGING ABNORMALITY: ICD-10-CM
